# Patient Record
Sex: MALE | Race: WHITE | Employment: UNEMPLOYED | URBAN - METROPOLITAN AREA
[De-identification: names, ages, dates, MRNs, and addresses within clinical notes are randomized per-mention and may not be internally consistent; named-entity substitution may affect disease eponyms.]

---

## 2017-06-06 ENCOUNTER — APPOINTMENT (OUTPATIENT)
Dept: GENERAL RADIOLOGY | Age: 22
End: 2017-06-06
Payer: MEDICARE

## 2017-06-06 ENCOUNTER — HOSPITAL ENCOUNTER (EMERGENCY)
Age: 22
Discharge: HOME | End: 2017-06-06
Attending: EMERGENCY MEDICINE
Payer: MEDICARE

## 2017-06-06 VITALS
DIASTOLIC BLOOD PRESSURE: 86 MMHG | HEART RATE: 64 BPM | TEMPERATURE: 98 F | RESPIRATION RATE: 16 BRPM | BODY MASS INDEX: 22.28 KG/M2 | SYSTOLIC BLOOD PRESSURE: 121 MMHG | WEIGHT: 138.6 LBS | OXYGEN SATURATION: 100 % | HEIGHT: 66 IN

## 2017-06-06 DIAGNOSIS — J45.909 ASTHMA WITH BRONCHITIS: Primary | ICD-10-CM

## 2017-06-06 PROCEDURE — 6370000000 HC RX 637 (ALT 250 FOR IP): Performed by: NURSE PRACTITIONER

## 2017-06-06 PROCEDURE — 94640 AIRWAY INHALATION TREATMENT: CPT

## 2017-06-06 PROCEDURE — 99283 EMERGENCY DEPT VISIT LOW MDM: CPT

## 2017-06-06 PROCEDURE — 71020 XR CHEST STANDARD TWO VW: CPT

## 2017-06-06 RX ORDER — ALBUTEROL SULFATE 90 UG/1
1-2 AEROSOL, METERED RESPIRATORY (INHALATION) EVERY 6 HOURS PRN
Qty: 1 INHALER | Refills: 0 | Status: SHIPPED | OUTPATIENT
Start: 2017-06-06 | End: 2018-04-23

## 2017-06-06 RX ORDER — BUDESONIDE AND FORMOTEROL FUMARATE DIHYDRATE 160; 4.5 UG/1; UG/1
2 AEROSOL RESPIRATORY (INHALATION) 2 TIMES DAILY
Qty: 1 INHALER | Refills: 0 | Status: SHIPPED | OUTPATIENT
Start: 2017-06-06 | End: 2018-04-23

## 2017-06-06 RX ORDER — PREDNISONE 20 MG/1
60 TABLET ORAL ONCE
Status: COMPLETED | OUTPATIENT
Start: 2017-06-06 | End: 2017-06-06

## 2017-06-06 RX ORDER — AZITHROMYCIN 250 MG/1
TABLET, FILM COATED ORAL
Qty: 1 PACKET | Refills: 0 | Status: SHIPPED | OUTPATIENT
Start: 2017-06-06 | End: 2017-06-16

## 2017-06-06 RX ORDER — ALBUTEROL SULFATE 90 UG/1
2 AEROSOL, METERED RESPIRATORY (INHALATION) ONCE
Status: COMPLETED | OUTPATIENT
Start: 2017-06-06 | End: 2017-06-06

## 2017-06-06 RX ADMIN — PREDNISONE 60 MG: 20 TABLET ORAL at 12:09

## 2017-06-06 RX ADMIN — ALBUTEROL SULFATE 2 PUFF: 90 AEROSOL, METERED RESPIRATORY (INHALATION) at 12:10

## 2017-06-06 ASSESSMENT — PAIN DESCRIPTION - FREQUENCY: FREQUENCY: INTERMITTENT

## 2017-06-06 ASSESSMENT — PAIN DESCRIPTION - DESCRIPTORS: DESCRIPTORS: ACHING

## 2017-06-06 ASSESSMENT — PAIN SCALES - GENERAL: PAINLEVEL_OUTOF10: 2

## 2017-06-06 ASSESSMENT — PAIN SCALES - WONG BAKER: WONGBAKER_NUMERICALRESPONSE: 2

## 2018-04-23 ENCOUNTER — HOSPITAL ENCOUNTER (EMERGENCY)
Age: 23
Discharge: HOME | End: 2018-04-23
Attending: EMERGENCY MEDICINE
Payer: MEDICARE

## 2018-04-23 VITALS
HEART RATE: 78 BPM | DIASTOLIC BLOOD PRESSURE: 68 MMHG | OXYGEN SATURATION: 98 % | BODY MASS INDEX: 23.3 KG/M2 | HEIGHT: 66 IN | SYSTOLIC BLOOD PRESSURE: 143 MMHG | WEIGHT: 145 LBS | RESPIRATION RATE: 16 BRPM | TEMPERATURE: 98.2 F

## 2018-04-23 DIAGNOSIS — R03.0 ELEVATED BLOOD PRESSURE READING: ICD-10-CM

## 2018-04-23 DIAGNOSIS — J45.909 MILD ASTHMA WITHOUT COMPLICATION, UNSPECIFIED WHETHER PERSISTENT: Primary | ICD-10-CM

## 2018-04-23 PROCEDURE — 99284 EMERGENCY DEPT VISIT MOD MDM: CPT

## 2018-04-23 RX ORDER — ALBUTEROL SULFATE 90 UG/1
1-2 AEROSOL, METERED RESPIRATORY (INHALATION) EVERY 6 HOURS PRN
Qty: 1 INHALER | Refills: 0 | Status: SHIPPED | OUTPATIENT
Start: 2018-04-23 | End: 2018-07-09

## 2018-04-23 RX ORDER — BUDESONIDE AND FORMOTEROL FUMARATE DIHYDRATE 160; 4.5 UG/1; UG/1
2 AEROSOL RESPIRATORY (INHALATION) 2 TIMES DAILY
Qty: 1 INHALER | Refills: 0 | Status: SHIPPED | OUTPATIENT
Start: 2018-04-23

## 2018-04-23 RX ORDER — ALBUTEROL SULFATE 2.5 MG/3ML
2.5 SOLUTION RESPIRATORY (INHALATION) EVERY 6 HOURS PRN
Qty: 120 EACH | Refills: 3 | Status: SHIPPED | OUTPATIENT
Start: 2018-04-23 | End: 2018-07-09

## 2018-04-23 RX ORDER — MONTELUKAST SODIUM 10 MG/1
10 TABLET ORAL NIGHTLY
Qty: 30 TABLET | Refills: 3 | Status: SHIPPED | OUTPATIENT
Start: 2018-04-23 | End: 2019-10-21

## 2018-07-09 ENCOUNTER — APPOINTMENT (OUTPATIENT)
Dept: GENERAL RADIOLOGY | Age: 23
End: 2018-07-09
Payer: MEDICARE

## 2018-07-09 ENCOUNTER — HOSPITAL ENCOUNTER (EMERGENCY)
Age: 23
Discharge: HOME | End: 2018-07-09
Attending: EMERGENCY MEDICINE
Payer: MEDICARE

## 2018-07-09 VITALS
TEMPERATURE: 98.3 F | WEIGHT: 141 LBS | RESPIRATION RATE: 22 BRPM | HEIGHT: 66 IN | OXYGEN SATURATION: 98 % | SYSTOLIC BLOOD PRESSURE: 121 MMHG | BODY MASS INDEX: 22.66 KG/M2 | DIASTOLIC BLOOD PRESSURE: 68 MMHG | HEART RATE: 58 BPM

## 2018-07-09 DIAGNOSIS — J45.20 MILD INTERMITTENT ASTHMA WITHOUT COMPLICATION: Primary | ICD-10-CM

## 2018-07-09 PROCEDURE — 6360000002 HC RX W HCPCS: Performed by: EMERGENCY MEDICINE

## 2018-07-09 PROCEDURE — 71045 X-RAY EXAM CHEST 1 VIEW: CPT

## 2018-07-09 PROCEDURE — 94640 AIRWAY INHALATION TREATMENT: CPT

## 2018-07-09 PROCEDURE — 94664 DEMO&/EVAL PT USE INHALER: CPT

## 2018-07-09 PROCEDURE — 99285 EMERGENCY DEPT VISIT HI MDM: CPT

## 2018-07-09 PROCEDURE — 94760 N-INVAS EAR/PLS OXIMETRY 1: CPT

## 2018-07-09 RX ORDER — METHYLPREDNISOLONE SODIUM SUCCINATE 40 MG/ML
40 INJECTION, POWDER, LYOPHILIZED, FOR SOLUTION INTRAMUSCULAR; INTRAVENOUS ONCE
Status: DISCONTINUED | OUTPATIENT
Start: 2018-07-09 | End: 2018-07-09 | Stop reason: HOSPADM

## 2018-07-09 RX ORDER — ALBUTEROL SULFATE 90 UG/1
AEROSOL, METERED RESPIRATORY (INHALATION)
Qty: 1 INHALER | Refills: 0 | Status: SHIPPED | OUTPATIENT
Start: 2018-07-09 | End: 2019-10-21

## 2018-07-09 RX ORDER — SODIUM CHLORIDE 9 MG/ML
INJECTION, SOLUTION INTRAVENOUS CONTINUOUS
Status: DISCONTINUED | OUTPATIENT
Start: 2018-07-09 | End: 2018-07-09 | Stop reason: HOSPADM

## 2018-07-09 RX ORDER — BENZONATATE 100 MG/1
100 CAPSULE ORAL 3 TIMES DAILY PRN
Qty: 30 CAPSULE | Refills: 0 | Status: SHIPPED | OUTPATIENT
Start: 2018-07-09 | End: 2018-07-16

## 2018-07-09 RX ORDER — PREDNISONE 20 MG/1
20 TABLET ORAL 2 TIMES DAILY
Qty: 10 TABLET | Refills: 0 | Status: SHIPPED | OUTPATIENT
Start: 2018-07-09 | End: 2018-07-19

## 2018-07-09 RX ORDER — AZITHROMYCIN 250 MG/1
TABLET, FILM COATED ORAL
Qty: 1 PACKET | Refills: 0 | Status: SHIPPED | OUTPATIENT
Start: 2018-07-09 | End: 2018-07-19

## 2018-07-09 RX ADMIN — ALBUTEROL SULFATE 2.5 MG: 5 SOLUTION RESPIRATORY (INHALATION) at 14:32

## 2018-07-09 ASSESSMENT — ENCOUNTER SYMPTOMS
ALLERGIC/IMMUNOLOGIC NEGATIVE: 1
WHEEZING: 1
EYES NEGATIVE: 1
SHORTNESS OF BREATH: 1
COUGH: 1

## 2018-07-09 NOTE — ED PROVIDER NOTES
[address] [place] [address] ED  eMERGENCY dEPARTMENT eNCOUnter      Pt Name: Shannan Neville  MRN: [id number]  [place] 1995  Date of evaluation: 7/9/2018  Provider: Mariam Biswas MD    CHIEF COMPLAINT       Chief Complaint   Patient presents with    Wheezing     out of inhalers/nebulizer meds         HISTORY OF PRESENT ILLNESS  (Location/Symptom, Timing/Onset, Context/Setting, Quality, Duration, Modifying Factors, Severity.)   Shannan Neville is a 26 y.o. male who presents to the emergency department   Complaining of cough and wheezing for the last 2 days  History of bronchial asthma and ran out of medication  Nursing Notes were review  Smoking  one pack of cigarettes a day    PAST MEDICAL HISTORY     Past Medical History:   Diagnosis Date    Asthma          SURGICAL HISTORY       Past Surgical History:   Procedure Laterality Date    HERNIA REPAIR           CURRENT MEDICATIONS       Previous Medications    BUDESONIDE-FORMOTEROL (SYMBICORT) 160-4.5 MCG/ACT AERO    Inhale 2 puffs into the lungs 2 times daily    IBUPROFEN (ADVIL;MOTRIN) 800 MG TABLET    Take 1 tablet by mouth every 8 hours as needed for Pain    MONTELUKAST (SINGULAIR) 10 MG TABLET    Take 1 tablet by mouth nightly    ONDANSETRON (ZOFRAN ODT) 4 MG DISINTEGRATING TABLET    Take 1 tablet by mouth every 8 hours as needed for Nausea       ALLERGIES     Patient has no known allergies. FAMILY HISTORY     History reviewed. No pertinent family history.        SOCIAL HISTORY       Social History     Social History    Marital status: Single     Spouse name: N/A    Number of children: N/A    Years of education: N/A     Social History Main Topics    Smoking status: Current Every Day Smoker     Packs/day: 1.00     Types: Cigarettes    Smokeless tobacco: Never Used    Alcohol use Yes      Comment: socially    Drug use: No    Sexual activity: Not Asked     Other Topics Concern    None     Social History Narrative    None         REVIEW OF SYSTEMS    (2-9 systems

## 2018-07-09 NOTE — LETTER
[facility] ED  [place] [address]  Phone: [phone]               July 9, 2018    Patient: Rell Sweeney   YOB: 1995   Date of Visit: 7/9/2018       To Whom It May Concern:    Rell Sweeney was seen and treated in our emergency department on 7/9/2018. He may return to work on 7/10/18.       Sincerely,       Sybil Samuels RN         Signature:__________________________________

## 2018-07-09 NOTE — ED NOTES
HX asthma ran out of meds smokes woke up today with wheezing and difficulty breathing denies cough pain or fever. Some wheezing audible able to answer questions.      Elma Flores RN  07/09/18 3568

## 2019-02-18 ENCOUNTER — HOSPITAL ENCOUNTER (EMERGENCY)
Age: 24
Discharge: HOME | End: 2019-02-18
Payer: MEDICARE

## 2019-02-18 VITALS
TEMPERATURE: 98.2 F | RESPIRATION RATE: 16 BRPM | SYSTOLIC BLOOD PRESSURE: 93 MMHG | DIASTOLIC BLOOD PRESSURE: 58 MMHG | HEART RATE: 86 BPM | WEIGHT: 140 LBS | OXYGEN SATURATION: 99 % | BODY MASS INDEX: 22.5 KG/M2 | HEIGHT: 66 IN

## 2019-02-18 DIAGNOSIS — T78.40XA ALLERGIC REACTION, INITIAL ENCOUNTER: Primary | ICD-10-CM

## 2019-02-18 PROCEDURE — 99282 EMERGENCY DEPT VISIT SF MDM: CPT

## 2019-02-18 PROCEDURE — 6370000000 HC RX 637 (ALT 250 FOR IP): Performed by: NURSE PRACTITIONER

## 2019-02-18 RX ORDER — DIPHENHYDRAMINE HCL 25 MG
25 TABLET ORAL ONCE
Status: COMPLETED | OUTPATIENT
Start: 2019-02-18 | End: 2019-02-18

## 2019-02-18 RX ORDER — DIPHENHYDRAMINE HCL 25 MG
25 CAPSULE ORAL EVERY 6 HOURS PRN
Qty: 20 CAPSULE | Refills: 0 | Status: SHIPPED | OUTPATIENT
Start: 2019-02-18 | End: 2019-02-23

## 2019-02-18 RX ORDER — PREDNISONE 20 MG/1
60 TABLET ORAL ONCE
Status: COMPLETED | OUTPATIENT
Start: 2019-02-18 | End: 2019-02-18

## 2019-02-18 RX ORDER — PREDNISONE 20 MG/1
20 TABLET ORAL 2 TIMES DAILY
Qty: 10 TABLET | Refills: 0 | Status: SHIPPED | OUTPATIENT
Start: 2019-02-18 | End: 2019-02-23

## 2019-02-18 RX ADMIN — DIPHENHYDRAMINE HCL 25 MG: 25 TABLET ORAL at 21:07

## 2019-02-18 RX ADMIN — PREDNISONE 60 MG: 20 TABLET ORAL at 21:07

## 2019-10-21 ENCOUNTER — HOSPITAL ENCOUNTER (EMERGENCY)
Age: 24
Discharge: HOME | End: 2019-10-21
Attending: EMERGENCY MEDICINE

## 2019-10-21 VITALS
OXYGEN SATURATION: 98 % | BODY MASS INDEX: 23.03 KG/M2 | TEMPERATURE: 98.4 F | HEART RATE: 68 BPM | DIASTOLIC BLOOD PRESSURE: 69 MMHG | SYSTOLIC BLOOD PRESSURE: 120 MMHG | RESPIRATION RATE: 18 BRPM | WEIGHT: 143.31 LBS | HEIGHT: 66 IN

## 2019-10-21 DIAGNOSIS — H66.90 ACUTE OTITIS MEDIA, UNSPECIFIED OTITIS MEDIA TYPE: Primary | ICD-10-CM

## 2019-10-21 LAB
DIRECT EXAM: NORMAL
Lab: NORMAL
SPECIMEN DESCRIPTION: NORMAL

## 2019-10-21 PROCEDURE — 99282 EMERGENCY DEPT VISIT SF MDM: CPT

## 2019-10-21 PROCEDURE — 87880 STREP A ASSAY W/OPTIC: CPT

## 2019-10-21 RX ORDER — AMOXICILLIN 500 MG/1
500 CAPSULE ORAL 2 TIMES DAILY
Qty: 14 CAPSULE | Refills: 0 | Status: SHIPPED | OUTPATIENT
Start: 2019-10-21 | End: 2019-10-28

## 2019-10-21 RX ORDER — FLUTICASONE PROPIONATE 50 MCG
1 SPRAY, SUSPENSION (ML) NASAL DAILY
Qty: 1 BOTTLE | Refills: 0 | Status: SHIPPED | OUTPATIENT
Start: 2019-10-21

## 2019-10-21 ASSESSMENT — PAIN SCALES - GENERAL: PAINLEVEL_OUTOF10: 4

## 2019-10-22 ASSESSMENT — ENCOUNTER SYMPTOMS
COLOR CHANGE: 0
WHEEZING: 0
NAUSEA: 0
SINUS PRESSURE: 1
SHORTNESS OF BREATH: 0
SINUS PAIN: 1
DIARRHEA: 0
RHINORRHEA: 1
VOMITING: 0
ABDOMINAL PAIN: 0
COUGH: 0
CONSTIPATION: 0
SORE THROAT: 1

## 2020-08-06 ENCOUNTER — HOSPITAL ENCOUNTER (EMERGENCY)
Age: 25
Discharge: HOME | End: 2020-08-06
Attending: EMERGENCY MEDICINE

## 2020-08-06 VITALS
RESPIRATION RATE: 16 BRPM | TEMPERATURE: 97.8 F | HEIGHT: 66 IN | SYSTOLIC BLOOD PRESSURE: 145 MMHG | BODY MASS INDEX: 24.59 KG/M2 | HEART RATE: 110 BPM | OXYGEN SATURATION: 99 % | DIASTOLIC BLOOD PRESSURE: 76 MMHG | WEIGHT: 153 LBS

## 2020-08-06 LAB
ABSOLUTE EOS #: 0.1 K/UL (ref 0–0.44)
ABSOLUTE IMMATURE GRANULOCYTE: 0.04 K/UL (ref 0–0.3)
ABSOLUTE LYMPH #: 1.33 K/UL (ref 1.1–3.7)
ABSOLUTE MONO #: 0.7 K/UL (ref 0.1–1.2)
AMPHETAMINE SCREEN URINE: POSITIVE
ANION GAP SERPL CALCULATED.3IONS-SCNC: 14 MMOL/L (ref 9–17)
BARBITURATE SCREEN URINE: NEGATIVE
BASOPHILS # BLD: 0 % (ref 0–2)
BASOPHILS ABSOLUTE: 0.03 K/UL (ref 0–0.2)
BENZODIAZEPINE SCREEN, URINE: NEGATIVE
BUN BLDV-MCNC: 5 MG/DL (ref 6–20)
BUN/CREAT BLD: 7 (ref 9–20)
BUPRENORPHINE URINE: ABNORMAL
CALCIUM SERPL-MCNC: 8.7 MG/DL (ref 8.6–10.4)
CANNABINOID SCREEN URINE: POSITIVE
CHLORIDE BLD-SCNC: 102 MMOL/L (ref 98–107)
CO2: 19 MMOL/L (ref 20–31)
COCAINE METABOLITE, URINE: POSITIVE
CREAT SERPL-MCNC: 0.72 MG/DL (ref 0.7–1.2)
DIFFERENTIAL TYPE: ABNORMAL
EOSINOPHILS RELATIVE PERCENT: 1 % (ref 1–4)
ETHANOL PERCENT: 0.01 %
ETHANOL: 13 MG/DL
GFR AFRICAN AMERICAN: >60 ML/MIN
GFR NON-AFRICAN AMERICAN: >60 ML/MIN
GFR SERPL CREATININE-BSD FRML MDRD: ABNORMAL ML/MIN/{1.73_M2}
GFR SERPL CREATININE-BSD FRML MDRD: ABNORMAL ML/MIN/{1.73_M2}
GLUCOSE BLD-MCNC: 83 MG/DL (ref 70–99)
HCT VFR BLD CALC: 42.6 % (ref 40.7–50.3)
HEMOGLOBIN: 15 G/DL (ref 13–17)
IMMATURE GRANULOCYTES: 1 %
LYMPHOCYTES # BLD: 18 % (ref 24–43)
MCH RBC QN AUTO: 33.4 PG (ref 25.2–33.5)
MCHC RBC AUTO-ENTMCNC: 35.2 G/DL (ref 28.4–34.8)
MCV RBC AUTO: 94.9 FL (ref 82.6–102.9)
MDMA URINE: ABNORMAL
METHADONE SCREEN, URINE: NEGATIVE
METHAMPHETAMINE, URINE: ABNORMAL
MONOCYTES # BLD: 9 % (ref 3–12)
NRBC AUTOMATED: 0 PER 100 WBC
OPIATES, URINE: NEGATIVE
OXYCODONE SCREEN URINE: NEGATIVE
PDW BLD-RTO: 12.9 % (ref 11.8–14.4)
PHENCYCLIDINE, URINE: NEGATIVE
PLATELET # BLD: 168 K/UL (ref 138–453)
PLATELET ESTIMATE: ABNORMAL
PMV BLD AUTO: 9.1 FL (ref 8.1–13.5)
POTASSIUM SERPL-SCNC: 3.4 MMOL/L (ref 3.7–5.3)
PROPOXYPHENE, URINE: ABNORMAL
RBC # BLD: 4.49 M/UL (ref 4.21–5.77)
RBC # BLD: ABNORMAL 10*6/UL
SEG NEUTROPHILS: 71 % (ref 36–65)
SEGMENTED NEUTROPHILS ABSOLUTE COUNT: 5.4 K/UL (ref 1.5–8.1)
SODIUM BLD-SCNC: 135 MMOL/L (ref 135–144)
TEST INFORMATION: ABNORMAL
TRICYCLIC ANTIDEPRESSANTS, UR: ABNORMAL
WBC # BLD: 7.6 K/UL (ref 3.5–11.3)
WBC # BLD: ABNORMAL 10*3/UL

## 2020-08-06 PROCEDURE — 85025 COMPLETE CBC W/AUTO DIFF WBC: CPT

## 2020-08-06 PROCEDURE — 96360 HYDRATION IV INFUSION INIT: CPT

## 2020-08-06 PROCEDURE — 80307 DRUG TEST PRSMV CHEM ANLYZR: CPT

## 2020-08-06 PROCEDURE — 2580000003 HC RX 258: Performed by: EMERGENCY MEDICINE

## 2020-08-06 PROCEDURE — 80048 BASIC METABOLIC PNL TOTAL CA: CPT

## 2020-08-06 PROCEDURE — 99285 EMERGENCY DEPT VISIT HI MDM: CPT

## 2020-08-06 PROCEDURE — 93005 ELECTROCARDIOGRAM TRACING: CPT | Performed by: EMERGENCY MEDICINE

## 2020-08-06 PROCEDURE — G0480 DRUG TEST DEF 1-7 CLASSES: HCPCS

## 2020-08-06 RX ORDER — 0.9 % SODIUM CHLORIDE 0.9 %
1000 INTRAVENOUS SOLUTION INTRAVENOUS ONCE
Status: COMPLETED | OUTPATIENT
Start: 2020-08-06 | End: 2020-08-06

## 2020-08-06 RX ADMIN — SODIUM CHLORIDE 1000 ML: 9 INJECTION, SOLUTION INTRAVENOUS at 15:23

## 2020-08-06 ASSESSMENT — ENCOUNTER SYMPTOMS
VOMITING: 0
ABDOMINAL PAIN: 0
DIARRHEA: 0
COLOR CHANGE: 0
CONSTIPATION: 0
COUGH: 0
EYE REDNESS: 0
FACIAL SWELLING: 0
SHORTNESS OF BREATH: 0
EYE DISCHARGE: 0

## 2020-08-06 NOTE — ED PROVIDER NOTES
[address] [place] [address] ED  EMERGENCY DEPARTMENT ENCOUNTER      Pt Name: Tati De La Rosa  MRN: [id number]  [place] 1995  Date of evaluation: 8/6/2020  Provider: Leti Mckee MD    [address] [place] [address]       Chief Complaint   Patient presents with    Palpitations         HISTORY OF PRESENT ILLNESS  (Location/Symptom, Timing/Onset, Context/Setting, Quality, Duration, Modifying Factors, Severity.)   Tati De La Rosa is a 26 y.o. male who presents to the emergency department for fast heartbeat and feeling weak. This started about 2 hours ago when he was having an argument with his girlfriend on the phone. He states he had a panic attack. He states he drank a great deal of alcohol last night and he smokes marijuana but denies any illicit drug use otherwise. His heart was racing and he called paramedics and they brought him here. Denies any pain. Nursing Notes were reviewed. ALLERGIES     Patient has no known allergies. CURRENT MEDICATIONS       Previous Medications    BUDESONIDE-FORMOTEROL (SYMBICORT) 160-4.5 MCG/ACT AERO    Inhale 2 puffs into the lungs 2 times daily    FLUTICASONE (FLONASE) 50 MCG/ACT NASAL SPRAY    1 spray by Each Nostril route daily    IBUPROFEN (ADVIL;MOTRIN) 800 MG TABLET    Take 1 tablet by mouth every 8 hours as needed for Pain       PAST MEDICAL HISTORY         Diagnosis Date    Asthma        SURGICAL HISTORY           Procedure Laterality Date    HERNIA REPAIR           FAMILY HISTORY     No family history on file. No family status information on file. SOCIAL HISTORY      reports that he has been smoking cigarettes. He has been smoking about 1.00 pack per day. He has never used smokeless tobacco. He reports current alcohol use. He reports that he does not use drugs. REVIEW OF SYSTEMS    (2-9 systems for level 4, 10 or more for level 5)     Review of Systems   Constitutional: Negative for chills, fatigue and fever.    HENT: Negative for congestion, ear discharge and facial person, place, and time. Psychiatric:         Behavior: Behavior normal.             DIAGNOSTIC RESULTS     EKG: All EKG's are interpreted by the Emergency Department Physician who either signs or Co-signs this chart in the absence of a cardiologist.    EKG on my interpretation shows sinus rhythm with a rate of 107    RADIOLOGY:   Non-plain film images such as CT, Ultrasound and MRI are read by the radiologist. Plain radiographic images are visualized and preliminarily interpreted by the emergency physician with the below findings:    Not indicated    Interpretation per the Radiologist below, if available at the time of this note:        LABS:  Labs Reviewed   CBC WITH AUTO DIFFERENTIAL - Abnormal; Notable for the following components:       Result Value    MCHC 35.2 (*)     Seg Neutrophils 71 (*)     Lymphocytes 18 (*)     Immature Granulocytes 1 (*)     All other components within normal limits   BASIC METABOLIC PANEL - Abnormal; Notable for the following components:    BUN 5 (*)     Bun/Cre Ratio 7 (*)     Potassium 3.4 (*)     CO2 19 (*)     All other components within normal limits   ETHANOL - Abnormal; Notable for the following components:    Ethanol 13 (*)     Ethanol percent 0.013 (*)     All other components within normal limits   URINE DRUG SCREEN - Abnormal; Notable for the following components:    Amphetamine Screen, Ur POSITIVE (*)     Cocaine Metabolite, Urine POSITIVE (*)     Cannabinoid Scrn, Ur POSITIVE (*)     All other components within normal limits       All other labs were within normal range or not returned as of this dictation.     EMERGENCY DEPARTMENT COURSE and DIFFERENTIAL DIAGNOSIS/MDM:   Vitals:    Vitals:    08/06/20 1501   BP: (!) 145/76   Pulse: 110   Resp: 16   Temp: 97.8 °F (36.6 °C)   TempSrc: Oral   SpO2: 99%   Weight: 153 lb (69.4 kg)   Height: 5' 6\" (1.676 m)       Orders Placed This Encounter   Medications    0.9 % sodium chloride bolus       Medical Decision Making: He was

## 2020-08-06 NOTE — ED TRIAGE NOTES
Pt to via ems, stretcher to rm 27, bed per self. Pt c/o palpitations today. Pt AOx4. Presents very anxious. Pt was working outside. States he drank a lot of liquor last night. Also has relationship stress. Denies all drugs except marijuana. Patent airway, mild dyspnea, no cyanosis noted. Skin hot, flush and moist. Bed to lowest position, side rails up x2, call light within reach.

## 2020-08-07 LAB
EKG ATRIAL RATE: 107 BPM
EKG P AXIS: 76 DEGREES
EKG P-R INTERVAL: 134 MS
EKG Q-T INTERVAL: 352 MS
EKG QRS DURATION: 100 MS
EKG QTC CALCULATION (BAZETT): 469 MS
EKG R AXIS: 81 DEGREES
EKG T AXIS: 46 DEGREES
EKG VENTRICULAR RATE: 107 BPM

## 2020-08-07 PROCEDURE — 93010 ELECTROCARDIOGRAM REPORT: CPT | Performed by: INTERNAL MEDICINE

## 2021-03-27 ENCOUNTER — HOSPITAL ENCOUNTER (EMERGENCY)
Age: 26
Discharge: LEFT BEFORE DISCHARGE | End: 2021-03-27
Attending: EMERGENCY MEDICINE

## 2021-03-27 VITALS
OXYGEN SATURATION: 96 % | RESPIRATION RATE: 16 BRPM | HEART RATE: 110 BPM | TEMPERATURE: 98.1 F | HEIGHT: 66 IN | BODY MASS INDEX: 24.91 KG/M2 | WEIGHT: 155 LBS

## 2021-03-27 PROCEDURE — 99283 EMERGENCY DEPT VISIT LOW MDM: CPT

## 2021-03-27 ASSESSMENT — PAIN DESCRIPTION - PAIN TYPE: TYPE: ACUTE PAIN

## 2021-03-27 NOTE — ED NOTES
Bed: 24  Expected date: 3/27/21  Expected time: 7:32 PM  Means of arrival:   Comments:  [address] [place] [address] X Pedro Madrid RN  03/27/21 1857
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
## 2021-03-28 NOTE — ED PROVIDER NOTES
Patient eloped prior to my assessment.     Electronically signed by Supriya Sommer DO on 3/27/2021 at 8:10 PM      Supriya Sommer DO  Resident  03/27/21 2010

## 2022-01-23 ENCOUNTER — HOSPITAL ENCOUNTER (EMERGENCY)
Age: 27
Discharge: HOME | End: 2022-01-23
Attending: EMERGENCY MEDICINE

## 2022-01-23 ENCOUNTER — APPOINTMENT (OUTPATIENT)
Dept: GENERAL RADIOLOGY | Age: 27
End: 2022-01-23

## 2022-01-23 ENCOUNTER — APPOINTMENT (OUTPATIENT)
Dept: CT IMAGING | Age: 27
End: 2022-01-23

## 2022-01-23 VITALS
RESPIRATION RATE: 22 BRPM | DIASTOLIC BLOOD PRESSURE: 49 MMHG | HEART RATE: 126 BPM | TEMPERATURE: 97.2 F | SYSTOLIC BLOOD PRESSURE: 97 MMHG | OXYGEN SATURATION: 95 %

## 2022-01-23 DIAGNOSIS — F10.929 ACUTE ALCOHOLIC INTOXICATION WITH COMPLICATION (HCC): Primary | ICD-10-CM
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
## 2022-01-23 DIAGNOSIS — T75.4XXA INJURY FROM ELECTROSHOCK GUN, INITIAL ENCOUNTER: ICD-10-CM

## 2022-01-23 DIAGNOSIS — E87.20 LACTIC ACIDOSIS: ICD-10-CM

## 2022-01-23 LAB
-: ABNORMAL
ABSOLUTE EOS #: 0.68 K/UL (ref 0–0.4)
ABSOLUTE IMMATURE GRANULOCYTE: 0.68 K/UL (ref 0–0.3)
ABSOLUTE LYMPH #: 9.76 K/UL (ref 1–4.8)
ABSOLUTE MONO #: 2.27 K/UL (ref 0.1–0.8)
ACETAMINOPHEN LEVEL: <5 UG/ML (ref 10–30)
ALBUMIN SERPL-MCNC: 5.3 G/DL (ref 3.5–5.2)
ALBUMIN/GLOBULIN RATIO: 1.5 (ref 1–2.5)
ALLEN TEST: ABNORMAL
ALP BLD-CCNC: 140 U/L (ref 40–129)
ALT SERPL-CCNC: 34 U/L (ref 5–41)
AMORPHOUS: ABNORMAL
AMPHETAMINE SCREEN URINE: NEGATIVE
ANION GAP SERPL CALCULATED.3IONS-SCNC: 17 MMOL/L (ref 9–17)
ANION GAP SERPL CALCULATED.3IONS-SCNC: ABNORMAL MMOL/L (ref 9–17)
ANION GAP: 17 MMOL/L (ref 7–16)
AST SERPL-CCNC: 35 U/L
BACTERIA: ABNORMAL
BARBITURATE SCREEN URINE: NEGATIVE
BASOPHILS # BLD: 0 % (ref 0–2)
BASOPHILS ABSOLUTE: 0 K/UL (ref 0–0.2)
BENZODIAZEPINE SCREEN, URINE: NEGATIVE
BETA-HYDROXYBUTYRATE: 0.28 MMOL/L (ref 0.02–0.27)
BILIRUB SERPL-MCNC: 0.25 MG/DL (ref 0.3–1.2)
BILIRUBIN URINE: NEGATIVE
BUN BLDV-MCNC: 7 MG/DL (ref 6–20)
BUN BLDV-MCNC: 7 MG/DL (ref 6–20)
BUN/CREAT BLD: ABNORMAL (ref 9–20)
BUN/CREAT BLD: ABNORMAL (ref 9–20)
BUPRENORPHINE URINE: ABNORMAL
CALCIUM SERPL-MCNC: 7.9 MG/DL (ref 8.6–10.4)
CALCIUM SERPL-MCNC: 9.8 MG/DL (ref 8.6–10.4)
CANNABINOID SCREEN URINE: NEGATIVE
CASTS UA: ABNORMAL /LPF (ref 0–8)
CHLORIDE BLD-SCNC: 108 MMOL/L (ref 98–107)
CHLORIDE BLD-SCNC: 98 MMOL/L (ref 98–107)
CO2: 14 MMOL/L (ref 20–31)
CO2: <6 MMOL/L (ref 20–31)
COCAINE METABOLITE, URINE: POSITIVE
COLOR: YELLOW
CREAT SERPL-MCNC: 0.86 MG/DL (ref 0.7–1.2)
CREAT SERPL-MCNC: 1.38 MG/DL (ref 0.7–1.2)
CRYSTALS, UA: ABNORMAL /HPF
DIFFERENTIAL TYPE: ABNORMAL
EOSINOPHILS RELATIVE PERCENT: 3 % (ref 1–4)
EPITHELIAL CELLS UA: ABNORMAL /HPF (ref 0–5)
ETHANOL PERCENT: 0.31 %
ETHANOL: 309 MG/DL
FIO2: ABNORMAL
GFR AFRICAN AMERICAN: >60 ML/MIN
GFR AFRICAN AMERICAN: >60 ML/MIN
GFR NON-AFRICAN AMERICAN: >60 ML/MIN
GFR SERPL CREATININE-BSD FRML MDRD: >60 ML/MIN
GFR SERPL CREATININE-BSD FRML MDRD: ABNORMAL ML/MIN/{1.73_M2}
GFR SERPL CREATININE-BSD FRML MDRD: NORMAL ML/MIN/{1.73_M2}
GLUCOSE BLD-MCNC: 207 MG/DL (ref 70–99)
GLUCOSE BLD-MCNC: 77 MG/DL (ref 74–100)
GLUCOSE BLD-MCNC: 87 MG/DL (ref 70–99)
GLUCOSE BLD-MCNC: 94 MG/DL
GLUCOSE BLD-MCNC: 94 MG/DL (ref 75–110)
GLUCOSE URINE: NEGATIVE
HCO3 VENOUS: 17.2 MMOL/L (ref 22–29)
HCT VFR BLD CALC: 54.3 % (ref 40.7–50.3)
HEMOGLOBIN: 17.4 G/DL (ref 13–17)
IMMATURE GRANULOCYTES: 3 %
KETONES, URINE: NEGATIVE
LACTIC ACID, SEPSIS WHOLE BLOOD: 6 MMOL/L (ref 0.5–1.9)
LACTIC ACID, SEPSIS: ABNORMAL MMOL/L (ref 0.5–1.9)
LEUKOCYTE ESTERASE, URINE: NEGATIVE
LYMPHOCYTES # BLD: 43 % (ref 24–44)
MCH RBC QN AUTO: 33.8 PG (ref 25.2–33.5)
MCHC RBC AUTO-ENTMCNC: 32 G/DL (ref 28.4–34.8)
MCV RBC AUTO: 105.4 FL (ref 82.6–102.9)
MDMA URINE: ABNORMAL
METHADONE SCREEN, URINE: NEGATIVE
METHAMPHETAMINE, URINE: ABNORMAL
MODE: ABNORMAL
MONOCYTES # BLD: 10 % (ref 1–7)
MORPHOLOGY: ABNORMAL
MUCUS: ABNORMAL
MYOGLOBIN: 189 NG/ML (ref 28–72)
NEGATIVE BASE EXCESS, VEN: 9 (ref 0–2)
NITRITE, URINE: NEGATIVE
NRBC AUTOMATED: 0 PER 100 WBC
O2 DEVICE/FLOW/%: ABNORMAL
O2 SAT, VEN: 83 % (ref 60–85)
OPIATES, URINE: NEGATIVE
OTHER OBSERVATIONS UA: ABNORMAL
OXYCODONE SCREEN URINE: NEGATIVE
PATIENT TEMP: ABNORMAL
PCO2, VEN: 37 MM HG (ref 41–51)
PDW BLD-RTO: 12.5 % (ref 11.8–14.4)
PH UA: 5.5 (ref 5–8)
PH VENOUS: 7.28 (ref 7.32–7.43)
PHENCYCLIDINE, URINE: NEGATIVE
PLATELET # BLD: 247 K/UL (ref 138–453)
PLATELET ESTIMATE: ABNORMAL
PMV BLD AUTO: 9.8 FL (ref 8.1–13.5)
PO2, VEN: 53.2 MM HG (ref 30–50)
POC BUN: 7 MG/DL (ref 8–26)
POC CHLORIDE: 110 MMOL/L (ref 98–107)
POC CREATININE: 0.96 MG/DL (ref 0.51–1.19)
POC HEMATOCRIT: 44 % (ref 41–53)
POC HEMOGLOBIN: 15 G/DL (ref 13.5–17.5)
POC IONIZED CALCIUM: 1.09 MMOL/L (ref 1.15–1.33)
POC LACTIC ACID: 9.96 MMOL/L (ref 0.56–1.39)
POC PCO2 TEMP: ABNORMAL MM HG
POC PH TEMP: ABNORMAL
POC PO2 TEMP: ABNORMAL MM HG
POC POTASSIUM: 4 MMOL/L (ref 3.5–4.5)
POC SODIUM: 144 MMOL/L (ref 138–146)
POC TCO2: 18 MMOL/L (ref 22–30)
POSITIVE BASE EXCESS, VEN: ABNORMAL (ref 0–3)
POTASSIUM SERPL-SCNC: 4 MMOL/L (ref 3.7–5.3)
POTASSIUM SERPL-SCNC: 4.1 MMOL/L (ref 3.7–5.3)
PROPOXYPHENE, URINE: ABNORMAL
PROTEIN UA: ABNORMAL
RBC # BLD: 5.15 M/UL (ref 4.21–5.77)
RBC # BLD: ABNORMAL 10*6/UL
RBC UA: ABNORMAL /HPF (ref 0–4)
RENAL EPITHELIAL, UA: ABNORMAL /HPF
SALICYLATE LEVEL: <1 MG/DL (ref 3–10)
SAMPLE SITE: ABNORMAL
SEG NEUTROPHILS: 41 % (ref 36–66)
SEGMENTED NEUTROPHILS ABSOLUTE COUNT: 9.31 K/UL (ref 1.8–7.7)
SODIUM BLD-SCNC: 139 MMOL/L (ref 135–144)
SODIUM BLD-SCNC: 141 MMOL/L (ref 135–144)
SPECIFIC GRAVITY UA: 1.01 (ref 1–1.03)
TEST INFORMATION: ABNORMAL
TOTAL CK: 330 U/L (ref 39–308)
TOTAL CO2, VENOUS: ABNORMAL MMOL/L (ref 23–30)
TOTAL PROTEIN: 8.8 G/DL (ref 6.4–8.3)
TOXIC TRICYCLIC SC,BLOOD: NEGATIVE
TRICHOMONAS: ABNORMAL
TRICYCLIC ANTIDEPRESSANTS, UR: ABNORMAL
TROPONIN INTERP: NORMAL
TROPONIN T: NORMAL NG/ML
TROPONIN, HIGH SENSITIVITY: <6 NG/L (ref 0–22)
TURBIDITY: CLEAR
URINE HGB: ABNORMAL
UROBILINOGEN, URINE: NORMAL
WBC # BLD: 22.7 K/UL (ref 3.5–11.3)
WBC # BLD: ABNORMAL 10*3/UL
WBC UA: ABNORMAL /HPF (ref 0–5)
YEAST: ABNORMAL

## 2022-01-23 PROCEDURE — 2580000003 HC RX 258: Performed by: STUDENT IN AN ORGANIZED HEALTH CARE EDUCATION/TRAINING PROGRAM

## 2022-01-23 PROCEDURE — 80051 ELECTROLYTE PANEL: CPT

## 2022-01-23 PROCEDURE — 80053 COMPREHEN METABOLIC PANEL: CPT

## 2022-01-23 PROCEDURE — 84484 ASSAY OF TROPONIN QUANT: CPT

## 2022-01-23 PROCEDURE — 96360 HYDRATION IV INFUSION INIT: CPT

## 2022-01-23 PROCEDURE — 6360000002 HC RX W HCPCS

## 2022-01-23 PROCEDURE — 6360000004 HC RX CONTRAST MEDICATION: Performed by: STUDENT IN AN ORGANIZED HEALTH CARE EDUCATION/TRAINING PROGRAM

## 2022-01-23 PROCEDURE — 81001 URINALYSIS AUTO W/SCOPE: CPT

## 2022-01-23 PROCEDURE — 73080 X-RAY EXAM OF ELBOW: CPT

## 2022-01-23 PROCEDURE — 3209999900 CT LUMBAR SPINE TRAUMA RECONSTRUCTION

## 2022-01-23 PROCEDURE — 71045 X-RAY EXAM CHEST 1 VIEW: CPT

## 2022-01-23 PROCEDURE — 82330 ASSAY OF CALCIUM: CPT

## 2022-01-23 PROCEDURE — 73130 X-RAY EXAM OF HAND: CPT

## 2022-01-23 PROCEDURE — 72125 CT NECK SPINE W/O DYE: CPT

## 2022-01-23 PROCEDURE — 82565 ASSAY OF CREATININE: CPT

## 2022-01-23 PROCEDURE — 80048 BASIC METABOLIC PNL TOTAL CA: CPT

## 2022-01-23 PROCEDURE — 3209999900 CT THORACIC SPINE TRAUMA RECONSTRUCTION

## 2022-01-23 PROCEDURE — 85014 HEMATOCRIT: CPT

## 2022-01-23 PROCEDURE — 85025 COMPLETE CBC W/AUTO DIFF WBC: CPT

## 2022-01-23 PROCEDURE — 84520 ASSAY OF UREA NITROGEN: CPT

## 2022-01-23 PROCEDURE — 93005 ELECTROCARDIOGRAM TRACING: CPT | Performed by: STUDENT IN AN ORGANIZED HEALTH CARE EDUCATION/TRAINING PROGRAM

## 2022-01-23 PROCEDURE — 73562 X-RAY EXAM OF KNEE 3: CPT

## 2022-01-23 PROCEDURE — 80179 DRUG ASSAY SALICYLATE: CPT

## 2022-01-23 PROCEDURE — 73060 X-RAY EXAM OF HUMERUS: CPT

## 2022-01-23 PROCEDURE — 82803 BLOOD GASES ANY COMBINATION: CPT

## 2022-01-23 PROCEDURE — 83605 ASSAY OF LACTIC ACID: CPT

## 2022-01-23 PROCEDURE — 82550 ASSAY OF CK (CPK): CPT

## 2022-01-23 PROCEDURE — 96361 HYDRATE IV INFUSION ADD-ON: CPT

## 2022-01-23 PROCEDURE — 71260 CT THORAX DX C+: CPT

## 2022-01-23 PROCEDURE — G0480 DRUG TEST DEF 1-7 CLASSES: HCPCS

## 2022-01-23 PROCEDURE — 82947 ASSAY GLUCOSE BLOOD QUANT: CPT

## 2022-01-23 PROCEDURE — 99284 EMERGENCY DEPT VISIT MOD MDM: CPT

## 2022-01-23 PROCEDURE — 83874 ASSAY OF MYOGLOBIN: CPT

## 2022-01-23 PROCEDURE — 82010 KETONE BODYS QUAN: CPT

## 2022-01-23 PROCEDURE — 80143 DRUG ASSAY ACETAMINOPHEN: CPT

## 2022-01-23 PROCEDURE — 70450 CT HEAD/BRAIN W/O DYE: CPT

## 2022-01-23 PROCEDURE — 80307 DRUG TEST PRSMV CHEM ANLYZR: CPT

## 2022-01-23 PROCEDURE — 73030 X-RAY EXAM OF SHOULDER: CPT

## 2022-01-23 PROCEDURE — 96372 THER/PROPH/DIAG INJ SC/IM: CPT

## 2022-01-23 RX ORDER — 0.9 % SODIUM CHLORIDE 0.9 %
1000 INTRAVENOUS SOLUTION INTRAVENOUS ONCE
Status: COMPLETED | OUTPATIENT
Start: 2022-01-23 | End: 2022-01-23

## 2022-01-23 RX ORDER — PROMETHAZINE HYDROCHLORIDE 25 MG/ML
25 INJECTION, SOLUTION INTRAMUSCULAR; INTRAVENOUS ONCE
Status: COMPLETED | OUTPATIENT
Start: 2022-01-23 | End: 2022-01-23

## 2022-01-23 RX ORDER — PROMETHAZINE HYDROCHLORIDE 25 MG/ML
INJECTION, SOLUTION INTRAMUSCULAR; INTRAVENOUS
Status: COMPLETED
Start: 2022-01-23 | End: 2022-01-23

## 2022-01-23 RX ADMIN — SODIUM CHLORIDE 1000 ML: 9 INJECTION, SOLUTION INTRAVENOUS at 03:11

## 2022-01-23 RX ADMIN — SODIUM CHLORIDE 1000 ML: 9 INJECTION, SOLUTION INTRAVENOUS at 05:19

## 2022-01-23 RX ADMIN — PROMETHAZINE HYDROCHLORIDE: 25 INJECTION, SOLUTION INTRAMUSCULAR; INTRAVENOUS at 02:50

## 2022-01-23 RX ADMIN — PROMETHAZINE HYDROCHLORIDE: 25 INJECTION INTRAMUSCULAR; INTRAVENOUS at 02:50

## 2022-01-23 RX ADMIN — IOPAMIDOL 75 ML: 755 INJECTION, SOLUTION INTRAVENOUS at 03:49

## 2022-01-23 ASSESSMENT — PAIN SCALES - GENERAL: PAINLEVEL_OUTOF10: 10

## 2022-01-23 ASSESSMENT — ENCOUNTER SYMPTOMS
SORE THROAT: 0
BACK PAIN: 1
COUGH: 0
VOMITING: 0
DIARRHEA: 0
ABDOMINAL PAIN: 0
CONSTIPATION: 0
RHINORRHEA: 0
NAUSEA: 1
SHORTNESS OF BREATH: 0

## 2022-01-23 NOTE — ED NOTES
Bed: 15  Expected date:   Expected time:   Means of arrival:   Comments:  TORSTEN Cagle, ISIDRO  01/23/22 9064
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
## 2022-01-23 NOTE — ED TRIAGE NOTES
Found by police roaming outside shirtless  Unsteady gait, falling down   Tazed multiple times by police  Pt appears shivering  Positive ETOH   Pt maintaining airway but hyperventilating   EKG done, labs drawn and hooked up to cardiac monitor

## 2022-01-23 NOTE — ED PROVIDER NOTES
[facility]  Emergency Department  Faculty Sign-Out Addendum     Care of Alexa Alcaraz was assumed from previous attending and is being seen for Other (dry tazed by police in the back )  . The patient's initial evaluation and plan have been discussed with the prior provider who initially evaluated the patient. Handoff taken on the following patient from prior Attending Physician:    Jah Pierson    I was available and discussed any additional care issues that arose and coordinated the management plans with the resident(s) caring for the patient during my duty period. Any areas of disagreement with residents documentation of care or procedures are noted on the chart. I was personally present for the key portions of any/all procedures during my duty period. I have documented in the chart those procedures where I was not present during the key portions.       EMERGENCY DEPARTMENT COURSE / MEDICAL DECISION MAKING:       MEDICATIONS GIVEN:  Orders Placed This Encounter   Medications    promethazine (PHENERGAN) 25 MG/ML injection     Noemi Starr: cabinet override    0.9 % sodium chloride bolus    promethazine (PHENERGAN) injection 25 mg    iopamidol (ISOVUE-370) 76 % injection 75 mL    0.9 % sodium chloride bolus       LABS / RADIOLOGY:     Labs Reviewed   CBC WITH AUTO DIFFERENTIAL - Abnormal; Notable for the following components:       Result Value    WBC 22.7 (*)     Hemoglobin 17.4 (*)     Hematocrit 54.3 (*)     .4 (*)     MCH 33.8 (*)     Immature Granulocytes 3 (*)     Monocytes 10 (*)     Absolute Immature Granulocyte 0.68 (*)     Segs Absolute 9.31 (*)     Absolute Lymph # 9.76 (*)     Absolute Mono # 2.27 (*)     Absolute Eos # 0.68 (*)     All other components within normal limits   COMPREHENSIVE METABOLIC PANEL W/ REFLEX TO MG FOR LOW K - Abnormal; Notable for the following components:    Glucose 207 (*)     CREATININE 1.38 (*)     CO2 <6 (*)     Alkaline Phosphatase 140 (*)     Total Bilirubin 0.25 (*)     Total Protein 8.8 (*)     Albumin 5.3 (*)     All other components within normal limits   TOX SCR, BLD, ED - Abnormal; Notable for the following components:    Acetaminophen Level <5 (*)     Ethanol 309 (*)     Ethanol percent 2.597 (*)     Salicylate Lvl <1 (*)     All other components within normal limits   URINALYSIS WITH MICROSCOPIC - Abnormal; Notable for the following components:    Urine Hgb MODERATE (*)     Protein, UA 2+ (*)     All other components within normal limits   URINE DRUG SCREEN - Abnormal; Notable for the following components:    Cocaine Metabolite, Urine POSITIVE (*)     All other components within normal limits   BETA-HYDROXYBUTYRATE - Abnormal; Notable for the following components:    Beta-Hydroxybutyrate 0.28 (*)     All other components within normal limits   CK - Abnormal; Notable for the following components:     Total  (*)     All other components within normal limits   MYOGLOBIN, SERUM - Abnormal; Notable for the following components:    Myoglobin 189 (*)     All other components within normal limits   ELECTROLYTES PLUS - Abnormal; Notable for the following components:    POC Chloride 110 (*)     POC TCO2 18 (*)     Anion Gap 17 (*)     All other components within normal limits   CALCIUM, IONIC (POC) - Abnormal; Notable for the following components:    POC Ionized Calcium 1.09 (*)     All other components within normal limits   BASIC METABOLIC PANEL - Abnormal; Notable for the following components:    Calcium 7.9 (*)     Chloride 108 (*)     CO2 14 (*)     All other components within normal limits   LACTATE, SEPSIS - Abnormal; Notable for the following components:    Lactic Acid, Sepsis, Whole Blood 6.0 (*)     All other components within normal limits   VENOUS BLOOD GAS, POINT OF CARE - Abnormal; Notable for the following components:    pH, Venkat 7.276 (*)     pCO2, Venkat 37.0 (*)     pO2, Venkat 53.2 (*)     HCO3, Venous 17.2 (*)     Negative Base Excess, Venkat 9 (*)     All other components within normal limits   UREA N (POC) - Abnormal; Notable for the following components:    POC BUN 7 (*)     All other components within normal limits   LACTIC ACID,POINT OF CARE - Abnormal; Notable for the following components:    POC Lactic Acid 9.96 (*)     All other components within normal limits   POCT GLUCOSE - Normal   TROPONIN   HGB/HCT   CREATININE W/GFR POINT OF CARE   POCT GLUCOSE   POC GLUCOSE FINGERSTICK   POC BLOOD GAS       XR HUMERUS LEFT (MIN 2 VIEWS)    Result Date: 1/23/2022  EXAMINATION: 3 XRAY VIEWS OF THE LEFT SHOULDER 1/23/2022 4:19 am COMPARISON: None. HISTORY: ORDERING SYSTEM PROVIDED HISTORY: Tazed, fell over fence, left arm and shoulder pain TECHNOLOGIST PROVIDED HISTORY: Melissa Cabrera, fell over fence, left arm and shoulder pain FINDINGS: There is normal alignment of the left shoulder. No fracture or osseous destructive lesion. No significant degenerative changes or soft tissue swelling. The left clavicle is intact. The left humerus is intact. There may be a small left elbow effusion. Dedicated left elbow radiographs could be performed for further evaluation. 1. Unremarkable radiographs of the left shoulder. 2. Left humerus is intact. 3. Questionable left elbow effusion. Dedicated left elbow radiographs could be performed for further evaluation. XR ELBOW LEFT (MIN 3 VIEWS)    Result Date: 1/23/2022  EXAMINATION: THREE XRAY VIEWS OF THE LEFT ELBOW 1/23/2022 7:42 am COMPARISON: Left humerus radiographs from 01/23/2022 HISTORY: ORDERING SYSTEM PROVIDED HISTORY: Fall, elbow effusion noted on humerus Xray TECHNOLOGIST PROVIDED HISTORY: Fall, elbow effusion noted on humerus Xray 50-year-old male with left elbow effusion noted on humerus x-ray FINDINGS: No sail sign or elbow joint effusion noted on the lateral view of the elbow. Radial head and radial neck appear intact.   Osseous alignment is normal.  No acute fracture or dislocation evident radiographically. Joint spaces well maintained. 1. No sail sign or elbow joint effusion noted on the lateral view. 2. No acute osseous abnormality evident by radiography. If pain persists, consider further evaluation with CT or MRI. XR HAND RIGHT (MIN 3 VIEWS)    Result Date: 1/23/2022  EXAMINATION: THREE XRAY VIEWS OF THE RIGHT HAND 1/23/2022 4:19 am COMPARISON: 03/26/2009 HISTORY: ORDERING SYSTEM PROVIDED HISTORY: right hand pain TECHNOLOGIST PROVIDED HISTORY: right hand pain FINDINGS: There is normal alignment of the right hand. No fracture or osseous destructive lesion. No appreciable soft tissue swelling. No significant degenerative changes. 1. Unremarkable radiographs of the right hand. XR KNEE LEFT (3 VIEWS)    Result Date: 1/23/2022  EXAMINATION: THREE XRAY VIEWS OF THE LEFT KNEE 1/23/2022 4:19 am COMPARISON: None. HISTORY: ORDERING SYSTEM PROVIDED HISTORY: tazed, fall, bilateral knee pain and abrasions TECHNOLOGIST PROVIDED HISTORY: tazed, fall, bilateral knee pain and abrasions FINDINGS: No evidence of acute fracture or dislocation. No focal osseous lesion. No evidence of joint effusion. No focal soft tissue abnormality. No acute abnormality of the knee. XR KNEE RIGHT (3 VIEWS)    Result Date: 1/23/2022  EXAMINATION: THREE XRAY VIEWS OF THE RIGHT KNEE 1/23/2022 4:19 am COMPARISON: 11/15/2006 HISTORY: ORDERING SYSTEM PROVIDED HISTORY: tazed, fall, bilateral knee pain and abrasions TECHNOLOGIST PROVIDED HISTORY: tazed, fall, bilateral knee pain and abrasions FINDINGS: No evidence of acute fracture or dislocation. No focal osseous lesion. No evidence of joint effusion. No focal soft tissue abnormality. No acute abnormality of the knee.      CT HEAD WO CONTRAST    Result Date: 1/23/2022  EXAMINATION: CT OF THE HEAD WITHOUT CONTRAST  1/23/2022 3:47 am TECHNIQUE: CT of the head was performed without the administration of intravenous contrast. Dose modulation, iterative reconstruction, and/or weight based adjustment of the mA/kV was utilized to reduce the radiation dose to as low as reasonably achievable. COMPARISON: None. HISTORY: ORDERING SYSTEM PROVIDED HISTORY: Johnie, fell over fence on head TECHNOLOGIST PROVIDED HISTORY: Tim Walls, fell over fence on head Decision Support Exception - unselect if not a suspected or confirmed emergency medical condition->Emergency Medical Condition ([place]) Reason for Exam: Tim Walls, fell over fence on head FINDINGS: BRAIN/VENTRICLES: The cerebral and cerebellar parenchyma demonstrate normal volume. This study is degraded by motion artifact. There are no areas of acute hemorrhage, mass, or midline shift. No abnormal extra-axial fluid collections. The ventricles are proportional to the cerebral sulci. Gray-white differentiation is maintained. ORBITS: The visualized portion of the orbits demonstrate no acute abnormality. SINUSES: There is scattered mild paranasal sinus disease with greatest involvement in the maxillary sinuses. The mastoid air cells are clear. SOFT TISSUES/SKULL:  The calvarium is intact. The no appreciable scalp soft tissue swelling is identified. There is a piercing noted along the left side of the face. No acute intracranial abnormality. CT Cervical Spine WO Contrast    Result Date: 1/23/2022  EXAMINATION: CT OF THE CERVICAL SPINE WITHOUT CONTRAST 1/23/2022 3:47 am TECHNIQUE: CT of the cervical spine was performed without the administration of intravenous contrast. Multiplanar reformatted images are provided for review. Dose modulation, iterative reconstruction, and/or weight based adjustment of the mA/kV was utilized to reduce the radiation dose to as low as reasonably achievable. COMPARISON: None.  HISTORY: ORDERING SYSTEM PROVIDED HISTORY: Johnie fell on head over fence TECHNOLOGIST PROVIDED HISTORY: Johnie fell on head over fence Decision Support Exception - unselect if not a suspected or confirmed emergency medical condition->Emergency Medical Condition ([place]) Reason for Exam: [place], fell over fence on head FINDINGS: BONES/ALIGNMENT: There is normal alignment of the cervical spine. No fracture or osseous destructive lesion. DEGENERATIVE CHANGES: No significant degenerative disc disease. No critical central spinal canal narrowing. SOFT TISSUES: Limited images through the lung apices are unremarkable. No appreciable soft tissue swelling. No acute abnormality of the cervical spine. XR SHOULDER LEFT (MIN 2 VIEWS)    Result Date: 1/23/2022  EXAMINATION: 3 XRAY VIEWS OF THE LEFT SHOULDER 1/23/2022 4:19 am COMPARISON: None. HISTORY: ORDERING SYSTEM PROVIDED HISTORY: Tazed, fell over fence, left arm and shoulder pain TECHNOLOGIST PROVIDED HISTORY: [place], fell over fence, left arm and shoulder pain FINDINGS: There is normal alignment of the left shoulder. No fracture or osseous destructive lesion. No significant degenerative changes or soft tissue swelling. The left clavicle is intact. The left humerus is intact. There may be a small left elbow effusion. Dedicated left elbow radiographs could be performed for further evaluation. 1. Unremarkable radiographs of the left shoulder. 2. Left humerus is intact. 3. Questionable left elbow effusion. Dedicated left elbow radiographs could be performed for further evaluation. XR CHEST PORTABLE    Result Date: 1/23/2022  EXAMINATION: ONE XRAY VIEW OF THE CHEST 1/23/2022 4:19 am COMPARISON: None. HISTORY: ORDERING SYSTEM PROVIDED HISTORY: chest and back pain, tazed TECHNOLOGIST PROVIDED HISTORY: chest and back pain, tazed FINDINGS: The cardiac silhouette and mediastinal contours are normal.  The lungs are clear. No parenchymal lung infiltrate. No pleural effusion. The visualized osseous structures are unremarkable. 1. No acute cardiopulmonary disease.      CT CHEST ABDOMEN PELVIS W CONTRAST    Result Date: 1/23/2022  EXAMINATION: CT OF THE CHEST, ABDOMEN, AND PELVIS WITH CONTRAST; CT OF THE THORACIC SPINE WITHOUT CONTRAST; CT OF THE LUMBAR SPINE WITHOUT CONTRAST, 1/23/2022 3:48 am TECHNIQUE: CT of the chest, abdomen and pelvis was performed with the administration of intravenous contrast. Multiplanar reformatted images are provided for review. Dose modulation, iterative reconstruction, and/or weight based adjustment of the mA/kV was utilized to reduce the radiation dose to as low as reasonably achievable.; CT of the thoracic spine was performed without the administration of intravenous contrast. Multiplanar reformatted images are provided for review. Dose modulation, iterative reconstruction, and/or weight based adjustment of the mA/kV was utilized to reduce the radiation dose to as low as reasonably achievable.; CT of the lumbar spine was performed without the administration of intravenous contrast. Multiplanar reformatted images are provided for review. Adjustment of mA and/or kV according to patient size was utilized. Dose modulation, iterative reconstruction, and/or weight based adjustment of the mA/kV was utilized to reduce the radiation dose to as low as reasonably achievable. COMPARISON: None. HISTORY: ORDERING SYSTEM PROVIDED HISTORY: [place], fell over fence, multiple bruises and abrasions TECHNOLOGIST PROVIDED HISTORY: Adarsh Boards, fell over fence, multiple bruises and abrasions Decision Support Exception - unselect if not a suspected or confirmed emergency medical condition->Emergency Medical Condition ([place]) Reason for Exam:  [place], fell over fence, multiple bruises and abrasions; ORDERING SYSTEM PROVIDED HISTORY: tazed, fell over fence FINDINGS: CTA CHEST: There is adequate opacification of the thoracic aorta. Heart size is normal. No pericardial effusion. No mediastinal or hilar adenopathy. There are several small nodules in the right middle lobe measuring up to 3 mm, benign given the patient's age. No follow-up imaging is required.  Intrapulmonary lymph node is noted along the right major fissure inferiorly. No focal lung infiltrate. No pleural effusion or pneumothorax. No axillary adenopathy. No areas of abnormal soft tissue swelling are identified. The ribs are intact. CTA ABDOMEN: The liver, gallbladder, adrenal glands, pancreas, and spleen are unremarkable. The kidneys are unremarkable. No hydronephrosis. The stomach and duodenal sweep are unremarkable. Respiratory motion artifact limits evaluation of the bowel. No bowel obstruction. The appendix is normal. CTA PELVIS: The bladder is incompletely distended with urine. Central prostate calcifications are noted. Seminal vesicles are unremarkable. No inguinal or pelvic sidewall adenopathy. The abdominal aorta is normal in caliber. No retroperitoneal adenopathy. No appreciable soft tissue swelling is identified. The pelvic ring is intact. THORACIC/LUMBAR SPINE: No fracture in the thoracic or lumbar spine. Mild degenerative disc disease is noted at L5-S1. No spondylolysis. Overall alignment is normal.     1. No acute traumatic injury involving the chest, abdomen, and pelvis. 2. No acute fracture in the thoracic and lumbar spine. 3. Small noncalcified pulmonary nodules in the right middle lobe measuring up to 3 mm. No follow-up imaging is required as these are benign given the patient's age. 4. Central prostate calcifications. CT LUMBAR SPINE TRAUMA RECONSTRUCTION    Result Date: 1/23/2022  EXAMINATION: CT OF THE CHEST, ABDOMEN, AND PELVIS WITH CONTRAST; CT OF THE THORACIC SPINE WITHOUT CONTRAST; CT OF THE LUMBAR SPINE WITHOUT CONTRAST, 1/23/2022 3:48 am TECHNIQUE: CT of the chest, abdomen and pelvis was performed with the administration of intravenous contrast. Multiplanar reformatted images are provided for review.  Dose modulation, iterative reconstruction, and/or weight based adjustment of the mA/kV was utilized to reduce the radiation dose to as low as reasonably achievable.; CT of the thoracic spine was performed without the administration of intravenous contrast. Multiplanar reformatted images are provided for review. Dose modulation, iterative reconstruction, and/or weight based adjustment of the mA/kV was utilized to reduce the radiation dose to as low as reasonably achievable.; CT of the lumbar spine was performed without the administration of intravenous contrast. Multiplanar reformatted images are provided for review. Adjustment of mA and/or kV according to patient size was utilized. Dose modulation, iterative reconstruction, and/or weight based adjustment of the mA/kV was utilized to reduce the radiation dose to as low as reasonably achievable. COMPARISON: None. HISTORY: ORDERING SYSTEM PROVIDED HISTORY: [place], fell over fence, multiple bruises and abrasions TECHNOLOGIST PROVIDED HISTORY: Madisyn Simple, fell over fence, multiple bruises and abrasions Decision Support Exception - unselect if not a suspected or confirmed emergency medical condition->Emergency Medical Condition ([place]) Reason for Exam:  [place], fell over fence, multiple bruises and abrasions; ORDERING SYSTEM PROVIDED HISTORY: tazed, fell over fence FINDINGS: CTA CHEST: There is adequate opacification of the thoracic aorta. Heart size is normal. No pericardial effusion. No mediastinal or hilar adenopathy. There are several small nodules in the right middle lobe measuring up to 3 mm, benign given the patient's age. No follow-up imaging is required. Intrapulmonary lymph node is noted along the right major fissure inferiorly. No focal lung infiltrate. No pleural effusion or pneumothorax. No axillary adenopathy. No areas of abnormal soft tissue swelling are identified. The ribs are intact. CTA ABDOMEN: The liver, gallbladder, adrenal glands, pancreas, and spleen are unremarkable. The kidneys are unremarkable. No hydronephrosis. The stomach and duodenal sweep are unremarkable. Respiratory motion artifact limits evaluation of the bowel. No bowel obstruction. The appendix is normal. CTA PELVIS: The bladder is incompletely distended with urine. Central prostate calcifications are noted. Seminal vesicles are unremarkable. No inguinal or pelvic sidewall adenopathy. The abdominal aorta is normal in caliber. No retroperitoneal adenopathy. No appreciable soft tissue swelling is identified. The pelvic ring is intact. THORACIC/LUMBAR SPINE: No fracture in the thoracic or lumbar spine. Mild degenerative disc disease is noted at L5-S1. No spondylolysis. Overall alignment is normal.     1. No acute traumatic injury involving the chest, abdomen, and pelvis. 2. No acute fracture in the thoracic and lumbar spine. 3. Small noncalcified pulmonary nodules in the right middle lobe measuring up to 3 mm. No follow-up imaging is required as these are benign given the patient's age. 4. Central prostate calcifications. CT THORACIC SPINE TRAUMA RECONSTRUCTION    Result Date: 1/23/2022  EXAMINATION: CT OF THE CHEST, ABDOMEN, AND PELVIS WITH CONTRAST; CT OF THE THORACIC SPINE WITHOUT CONTRAST; CT OF THE LUMBAR SPINE WITHOUT CONTRAST, 1/23/2022 3:48 am TECHNIQUE: CT of the chest, abdomen and pelvis was performed with the administration of intravenous contrast. Multiplanar reformatted images are provided for review. Dose modulation, iterative reconstruction, and/or weight based adjustment of the mA/kV was utilized to reduce the radiation dose to as low as reasonably achievable.; CT of the thoracic spine was performed without the administration of intravenous contrast. Multiplanar reformatted images are provided for review. Dose modulation, iterative reconstruction, and/or weight based adjustment of the mA/kV was utilized to reduce the radiation dose to as low as reasonably achievable.; CT of the lumbar spine was performed without the administration of intravenous contrast. Multiplanar reformatted images are provided for review.   Adjustment of mA and/or kV according to patient size was utilized. Dose modulation, iterative reconstruction, and/or weight based adjustment of the mA/kV was utilized to reduce the radiation dose to as low as reasonably achievable. COMPARISON: None. HISTORY: ORDERING SYSTEM PROVIDED HISTORY: [place], fell over fence, multiple bruises and abrasions TECHNOLOGIST PROVIDED HISTORY: Pat Wheat, fell over fence, multiple bruises and abrasions Decision Support Exception - unselect if not a suspected or confirmed emergency medical condition->Emergency Medical Condition ([place]) Reason for Exam:  [place], fell over fence, multiple bruises and abrasions; ORDERING SYSTEM PROVIDED HISTORY: tazed, fell over fence FINDINGS: CTA CHEST: There is adequate opacification of the thoracic aorta. Heart size is normal. No pericardial effusion. No mediastinal or hilar adenopathy. There are several small nodules in the right middle lobe measuring up to 3 mm, benign given the patient's age. No follow-up imaging is required. Intrapulmonary lymph node is noted along the right major fissure inferiorly. No focal lung infiltrate. No pleural effusion or pneumothorax. No axillary adenopathy. No areas of abnormal soft tissue swelling are identified. The ribs are intact. CTA ABDOMEN: The liver, gallbladder, adrenal glands, pancreas, and spleen are unremarkable. The kidneys are unremarkable. No hydronephrosis. The stomach and duodenal sweep are unremarkable. Respiratory motion artifact limits evaluation of the bowel. No bowel obstruction. The appendix is normal. CTA PELVIS: The bladder is incompletely distended with urine. Central prostate calcifications are noted. Seminal vesicles are unremarkable. No inguinal or pelvic sidewall adenopathy. The abdominal aorta is normal in caliber. No retroperitoneal adenopathy. No appreciable soft tissue swelling is identified. The pelvic ring is intact. THORACIC/LUMBAR SPINE: No fracture in the thoracic or lumbar spine.   Mild degenerative disc disease is noted at

## 2022-01-24 LAB
EKG ATRIAL RATE: 132 BPM
EKG P AXIS: 77 DEGREES
EKG P-R INTERVAL: 134 MS
EKG Q-T INTERVAL: 326 MS
EKG QRS DURATION: 90 MS
EKG QTC CALCULATION (BAZETT): 483 MS
EKG R AXIS: 90 DEGREES
EKG T AXIS: 39 DEGREES
EKG VENTRICULAR RATE: 132 BPM

## 2022-01-24 PROCEDURE — 93010 ELECTROCARDIOGRAM REPORT: CPT | Performed by: INTERNAL MEDICINE

## 2022-01-26 NOTE — ED PROVIDER NOTES
Corby Krishnamurthy 46   Emergency Department  Faculty Attestation       I performed a history and physical examination of the patient and discussed management with the resident. I reviewed the residents note and agree with the documented findings including all diagnostic interpretations and plan of care. Any areas of disagreement are noted on the chart. I was personally present for the key portions of any procedures. I have documented in the chart those procedures where I was not present during the key portions. I have reviewed the emergency nurses triage note. I agree with the chief complaint, past medical history, past surgical history, allergies, medications, social and family history as documented unless otherwise noted below. Documentation of the HPI, Physical Exam and Medical Decision Making performed by scribnikko is based on my personal performance of the HPI, PE and MDM. For Physician Assistant/ Nurse Practitioner cases/documentation I have personally evaluated this patient and have completed at least one if not all key elements of the E/M (history, physical exam, and MDM). Additional findings are as noted. Pertinent Comments     Primary Care Physician: No primary care provider on file. ED Triage Vitals   BP Temp Temp Source Pulse Resp SpO2 Height Weight   01/23/22 0301 01/23/22 0425 01/23/22 0425 01/23/22 0245 01/23/22 0245 01/23/22 0255 -- --   116/60 97.2 °F (36.2 °C) Oral 129 25 97 %          History/Physical: This is a 33 y.o. male who presents to the Emergency Department with officers after prolonged michelle and being tazed. [facility], and falling . Currently single digits outside and therefore he was approached. He took off running. Per office involved.   He ran out of his shoe and sock on one side, fell multiple times on ice patches, and attempted to flip over a fence where he was laying half over it at one point. He was also tased multiple times in the back. Per EMS he did have 2 rounds of emesis in route. No medications given pTA. Patient is intoxicated and difficult to get a full history from. However, he states that he is cold and that he has back and abdominal pain. Reports EtOH. On exam patient is intoxicated and intermittently yelling but able to be redirected. Normocephalic, covered in debris, with abrasions. No signs of basilar skull fracture. C-collar in place with no midline tenderness, but is intoxicated. Heart sounds are tachycardic but regular. 2+ pulses throughout. Abdmen is soft, nondistended with no penetrating traumas. There is diffuse abdominal tenderness, but no guarding. Pelvis stable. Diffuse tenderness to the back, but no bony step off. Tenderness over the left shoulder with decreased range of motion due to pain. There is also tenderness and abrasion to bilateral knees. Patient has abrasions down the entirety of the left flank and abrasions over the knees and left arm. No barbs from the taser located. He is alert and oriented to person, place, and time. Uncertain all events. Has normal sensation. GCS 15. MDM/Plan:   Patient with prolonged police michelle, multiple falls, and tased  Patient is intoxicated, but following commands and answering questions. Does have large abrasions down the left side, abdominal pain with vomiting and paint over the back and left arm. FAST exam at bedside is negative   CT head given mechanism and intoxication  CT cervicla, thoracic, and lumbar  CT chest and abdomen/pelvis  Imaging of the left arm. Patient is tachycardic but likely due to some form of drug intoxication and anxiety. BP is stable. Will start with IVFs and then re-eval. If further treatment needed consider ativan. Basic labs    Imaging recommending dedicated elbow films - ordered. Had an acidosis - the glucose initially elevated, but normalized.   Lactic acidosis. Likely due to being tased and running. Will repeat labs and lactic acid after fluid resuscitation. Monitor until he is clinically sober    Signed out to oncoming physician. Critical Care: There was significant risk of life threatening deterioration of patient's condition requiring my direct management. Critical care time 15 minutes, excluding any documented procedures.       Jarret Madrigal MD  Attending Emergency Physician         Jarret Madrigal MD  01/26/22 [id number]

## 2025-01-14 ENCOUNTER — HOSPITAL ENCOUNTER (EMERGENCY)
Age: 30
Discharge: HOME | End: 2025-01-14
Attending: EMERGENCY MEDICINE

## 2025-01-14 ENCOUNTER — APPOINTMENT (OUTPATIENT)
Dept: GENERAL RADIOLOGY | Age: 30
End: 2025-01-14

## 2025-01-14 VITALS
BODY MASS INDEX: 24.11 KG/M2 | OXYGEN SATURATION: 98 % | HEART RATE: 98 BPM | DIASTOLIC BLOOD PRESSURE: 95 MMHG | WEIGHT: 150 LBS | TEMPERATURE: 99 F | SYSTOLIC BLOOD PRESSURE: 135 MMHG | HEIGHT: 66 IN | RESPIRATION RATE: 14 BRPM

## 2025-01-14 DIAGNOSIS — S63.501A SPRAIN OF RIGHT WRIST, INITIAL ENCOUNTER: Primary | ICD-10-CM

## 2025-01-14 PROCEDURE — 73130 X-RAY EXAM OF HAND: CPT

## 2025-01-14 PROCEDURE — 99283 EMERGENCY DEPT VISIT LOW MDM: CPT

## 2025-01-14 RX ORDER — KETOROLAC TROMETHAMINE 15 MG/ML
15 INJECTION, SOLUTION INTRAMUSCULAR; INTRAVENOUS ONCE
Status: DISCONTINUED | OUTPATIENT
Start: 2025-01-14 | End: 2025-01-14

## 2025-01-14 ASSESSMENT — LIFESTYLE VARIABLES
HOW OFTEN DO YOU HAVE A DRINK CONTAINING ALCOHOL: NEVER
HOW MANY STANDARD DRINKS CONTAINING ALCOHOL DO YOU HAVE ON A TYPICAL DAY: PATIENT DOES NOT DRINK

## 2025-01-14 ASSESSMENT — PAIN DESCRIPTION - ORIENTATION: ORIENTATION: RIGHT

## 2025-01-14 ASSESSMENT — PAIN DESCRIPTION - FREQUENCY: FREQUENCY: CONTINUOUS

## 2025-01-14 ASSESSMENT — PAIN DESCRIPTION - LOCATION: LOCATION: WRIST

## 2025-01-14 ASSESSMENT — PAIN SCALES - GENERAL: PAINLEVEL_OUTOF10: 7

## 2025-01-14 ASSESSMENT — PAIN - FUNCTIONAL ASSESSMENT: PAIN_FUNCTIONAL_ASSESSMENT: 0-10

## 2025-01-14 ASSESSMENT — PAIN DESCRIPTION - DESCRIPTORS: DESCRIPTORS: ACHING;THROBBING;STABBING

## 2025-01-14 NOTE — ED PROVIDER NOTES
[facility] EMERGENCY DEPARTMENT  eMERGENCY dEPARTMENT eNCOUnter   Attending Attestation     Pt Name: Oscar Winchester  MRN: [id number]  Birthdate 1995  Date of evaluation: 1/14/25       Oscar Winchester is a 29 y.o. male who presents with Wrist Injury (Right fell on ice today)      MDM:   Right-handed male who slipped and fell on the ice.  Patient presents with right wrist pain since then.  Pain has gotten progressively worse.  Patient was febrile but denies any other complaints.       Vitals:   Vitals:    01/14/25 1746   BP: (!) 135/95   Pulse: 98   Resp: 14   Temp: (!) 100.8 °F (38.2 °C)   TempSrc: Oral   SpO2: 98%   Weight: 68 kg (150 lb)   Height: 1.676 m (5' 6\")         I personally evaluated and examined the patient in conjunction with the resident and agree with the assessment, treatment plan, and disposition of the patient as recorded by the resident.    I performed a history and physical examination of the patient and discussed management with the resident. I reviewed the resident’s note and agree with the documented findings and plan of care. Any areas of disagreement are noted on the chart. I was personally present for the key portions of any procedures. I have documented in the chart those procedures where I was not present during the key portions. I have personally reviewed all images and agree with the resident's interpretation. I have reviewed the emergency nurses triage note. I agree with the chief complaint, past medical history, past surgical history, allergies, medications, social and family history as documented unless otherwise noted.    Guido Sotelo MD  Attending Emergency Physician           Guido Sotelo MD  01/14/25 2903    
   DISCONTD: ketorolac (TORADOL) injection 15 mg     CONSULTS:  None    FINAL IMPRESSION      1. Sprain of right wrist, initial encounter          DISPOSITION/PLAN   DISPOSITION Decision To Discharge 01/14/2025 07:42:08 PM   DISPOSITION CONDITION Stable           PATIENT REFERRED TO:  [facility] AT [address] [address] [phone]  Call in 2 days  Establish Care    DISCHARGE MEDICATIONS:  Discharge Medication List as of 1/14/2025  7:45 PM        Betty Kidd MD   Resident Physician  PGY 2